# Patient Record
Sex: MALE | Race: AMERICAN INDIAN OR ALASKA NATIVE
[De-identification: names, ages, dates, MRNs, and addresses within clinical notes are randomized per-mention and may not be internally consistent; named-entity substitution may affect disease eponyms.]

---

## 2018-11-26 ENCOUNTER — HOSPITAL ENCOUNTER (EMERGENCY)
Dept: HOSPITAL 42 - ED | Age: 23
Discharge: HOME | End: 2018-11-26
Payer: MEDICAID

## 2018-11-26 VITALS — HEART RATE: 66 BPM | DIASTOLIC BLOOD PRESSURE: 76 MMHG | OXYGEN SATURATION: 100 % | SYSTOLIC BLOOD PRESSURE: 148 MMHG

## 2018-11-26 VITALS — BODY MASS INDEX: 28.5 KG/M2

## 2018-11-26 VITALS — RESPIRATION RATE: 18 BRPM | TEMPERATURE: 98.3 F

## 2018-11-26 DIAGNOSIS — M79.10: Primary | ICD-10-CM

## 2018-11-26 PROCEDURE — 71101 X-RAY EXAM UNILAT RIBS/CHEST: CPT

## 2018-11-26 PROCEDURE — 99283 EMERGENCY DEPT VISIT LOW MDM: CPT

## 2018-11-26 PROCEDURE — 96372 THER/PROPH/DIAG INJ SC/IM: CPT

## 2018-11-26 NOTE — ED PDOC
Arrival/HPI





- General


Chief Complaint: Chest Pain


Historian: Patient





- History of Present Illness


Narrative History of Present Illness (Text): 





11/26/18 10:03


24 y/o male, no significant pmh, nkda, work in the freezer c/o rt. sided rib 

pain x 4 days with no fall or trauma or trauma.  Pt. stated that he does heavy 

lifting, aching pain, aggravated by movement, no chest pain or palpitation, no 

numbness or tingling, no coughing, no night sweat, no rash, no dizziness, no 

change in vision, no other medical or psychological complaints. 





Past Medical History





- Provider Review


Nursing Documentation Reviewed: Yes





- Infectious Disease


Hx of Infectious Diseases: None





- Psychiatric


Hx Substance Use: No





- Anesthesia


Hx Anesthesia: No


Hx Anesthesia Reactions: No


Hx Malignant Hyperthermia: No





Family/Social History





- Physician Review


Nursing Documentation Reviewed: Yes


Family/Social History: Unknown Family HX


Smoking Status: Never Smoked


Hx Alcohol Use: Yes


Hx Substance Use: No





Allergies/Home Meds


Allergies/Adverse Reactions: 


Allergies





No Known Allergies Allergy (Verified 11/26/18 09:38)


   











Review of Systems





- Review of Systems


Constitutional: absent: Fatigue, Fevers


Eyes: absent: Vision Changes


ENT: absent: Hearing Changes


Respiratory: absent: SOB, Cough


Cardiovascular: absent: Chest Pain


Gastrointestinal: absent: Abdominal Pain, Nausea, Vomiting


Genitourinary Male: absent: Dysuria, Frequency


Musculoskeletal: Arthralgias, Myalgias.  absent: Back Pain


Skin: absent: Rash, Pruritis


Psychiatric: absent: Anxiety, Depression





Physical Exam


Vital Signs Reviewed: Yes





Vital Signs











  Temp Pulse Resp BP Pulse Ox


 


 11/26/18 09:38  98.3 F  68  18  151/86 H  99











Temperature: Afebrile


Blood Pressure: Hypertensive


Pulse: Regular


Respiratory Rate: Normal


Appearance: Positive for: Well-Appearing, Non-Toxic, Comfortable


Pain Distress: Moderate


Mental Status: Positive for: Alert and Oriented X 3





- Systems Exam


Head: Present: Atraumatic, Normocephalic


Pupils: Present: PERRL


Extroacular Muscles: Present: EOMI


Conjunctiva: Present: Normal


Mouth: Present: Moist Mucous Membranes


Neck: Present: Normal Range of Motion


Respiratory/Chest: Present: Clear to Auscultation, Good Air Exchange, Tender to 

Palpation (Pain is 100 % reproducible by palpating on the rt. mid clavicular mid

rib cage region, no rash, no ecchymosis, ).  No: Respiratory Distress, Accessory

Muscle Use, Wheezes, Decreased Breath Sounds, Rales, Retracting, Rhonchi, 

Tachypneic


Cardiovascular: Present: Regular Rate and Rhythm, Normal S1, S2.  No: Murmurs


Abdomen: No: Tenderness, Distention, Peritoneal Signs


Back: Present: Normal Inspection


Upper Extremity: Present: Normal Inspection.  No: Cyanosis, Edema


Lower Extremity: Present: Normal Inspection.  No: Edema


Neurological: Present: GCS=15, CN II-XII Intact, Speech Normal


Skin: Present: Warm, Dry, Normal Color.  No: Rashes


Psychiatric: Present: Alert, Oriented x 3, Normal Insight, Normal Concentration





Medical Decision Making


ED Course and Treatment: 





11/26/18 10:05


-Xrays


-Toradol IM


-Observe and reassess





11/26/18 11:06


-Rt. Rib and Chest xray show Unremarkable radiographs of the chest and right 

ribs. No right rib fracture.


-Pt. feels much better, no pain now, all radiology results discussed.


-Discharge home with education on follow up with your own pmd within 2 days, 

return to the ER for any new or worsening signs or symptoms. 





- RAD Interpretation


Radiology Orders: 











11/26/18 10:02


RIBS RIGHT & PA CHEST [RAD] Stat 








-Rt. Rib and Chest xray








Date of service: 





11/26/2018





PROCEDURE:  Radiographs of the Chest and Right Ribs.





HISTORY:


rt. rib pain





COMPARISON:


None available. 





TECHNIQUE:


Frontal radiograph of the chest and multiple oblique radiographs of the right 

ribs were obtained.





FINDINGS:





RIGHT RIBS:


No fracture or focal lesion visualized.





LUNGS:


Clear.





PLEURA:


No pneumothorax or pleural fluid.





CARDIOVASCULAR:


Normal cardiac size. No pulmonary vascular congestion. 





No aortic atherosclerotic calcification present





OTHER FINDINGS:


None.





IMPRESSION:


Unremarkable radiographs of the chest and right ribs. No right rib fracture.





: Radiologist





- PA / NP / Resident Statement


MD/DO has reviewed & agrees with the documentation as recorded.





Disposition/Present on Arrival





- Present on Arrival


Any Indicators Present on Arrival: No


History of DVT/PE: No


History of Uncontrolled Diabetes: No


Urinary Catheter: No


History of Decub. Ulcer: No


History Surgical Site Infection Following: None





- Disposition


Have Diagnosis and Disposition been Completed?: Yes


Diagnosis: 


 Myalgia





Disposition: HOME/ ROUTINE


Disposition Time: 11:07


Patient Plan: Discharge


Condition: IMPROVED


Additional Instructions: 


-Discharge home with naproxen, flexeril, education on follow up with your own 

pmd within 2 days, return to the ER for any new or worsening signs or symptoms. 


Prescriptions: 


Cyclobenzaprine [Cyclobenzaprine HCl] 10 mg PO TID PRN #21 tab


 PRN Reason: Other


Naproxen 500 mg PO BID PRN #20


 PRN Reason: Other


Referrals: 


[facility] [place] [facility] [Outside] - Follow up with primary


Forms:  [employer] (English), WORK NOTE

## 2018-11-26 NOTE — RAD
Date of service: 



11/26/2018



PROCEDURE:  Radiographs of the Chest and Right Ribs.



HISTORY:

rt. rib pain



COMPARISON:

None available. 



TECHNIQUE:

Frontal radiograph of the chest and multiple oblique radiographs of 

the right ribs were obtained.



FINDINGS:



RIGHT RIBS:

No fracture or focal lesion visualized.



LUNGS:

Clear.



PLEURA:

No pneumothorax or pleural fluid.



CARDIOVASCULAR:

Normal cardiac size. No pulmonary vascular congestion. 



No aortic atherosclerotic calcification present



OTHER FINDINGS:

None.



IMPRESSION:

Unremarkable radiographs of the chest and right ribs. No right rib 

fracture.